# Patient Record
Sex: MALE
[De-identification: names, ages, dates, MRNs, and addresses within clinical notes are randomized per-mention and may not be internally consistent; named-entity substitution may affect disease eponyms.]

---

## 2020-01-16 ENCOUNTER — HOSPITAL ENCOUNTER (EMERGENCY)
Dept: HOSPITAL 92 - ERS | Age: 70
Discharge: HOME | End: 2020-01-16
Payer: MEDICARE

## 2020-01-16 DIAGNOSIS — L60.0: ICD-10-CM

## 2020-01-16 DIAGNOSIS — Z79.899: ICD-10-CM

## 2020-01-16 DIAGNOSIS — F41.9: ICD-10-CM

## 2020-01-16 DIAGNOSIS — I25.2: ICD-10-CM

## 2020-01-16 DIAGNOSIS — Z79.82: ICD-10-CM

## 2020-01-16 DIAGNOSIS — I25.10: ICD-10-CM

## 2020-01-16 DIAGNOSIS — I10: ICD-10-CM

## 2020-01-16 DIAGNOSIS — Z79.01: ICD-10-CM

## 2020-01-16 DIAGNOSIS — Z95.5: ICD-10-CM

## 2020-01-16 DIAGNOSIS — L03.116: Primary | ICD-10-CM

## 2020-01-16 LAB
ALBUMIN SERPL BCG-MCNC: 4.2 G/DL (ref 3.4–4.8)
ALP SERPL-CCNC: 88 U/L (ref 40–110)
ALT SERPL W P-5'-P-CCNC: 36 U/L (ref 8–55)
ANION GAP SERPL CALC-SCNC: 10 MMOL/L (ref 10–20)
AST SERPL-CCNC: 22 U/L (ref 5–34)
BASOPHILS # BLD AUTO: 0.1 THOU/UL (ref 0–0.2)
BASOPHILS NFR BLD AUTO: 0.7 % (ref 0–1)
BILIRUB SERPL-MCNC: 0.9 MG/DL (ref 0.2–1.2)
BUN SERPL-MCNC: 17 MG/DL (ref 8.4–25.7)
CALCIUM SERPL-MCNC: 9.2 MG/DL (ref 7.8–10.44)
CHLORIDE SERPL-SCNC: 104 MMOL/L (ref 98–107)
CO2 SERPL-SCNC: 27 MMOL/L (ref 23–31)
CREAT CL PREDICTED SERPL C-G-VRATE: 0 ML/MIN (ref 70–130)
EOSINOPHIL # BLD AUTO: 0.2 THOU/UL (ref 0–0.7)
EOSINOPHIL NFR BLD AUTO: 2 % (ref 0–10)
GLOBULIN SER CALC-MCNC: 3 G/DL (ref 2.4–3.5)
GLUCOSE SERPL-MCNC: 191 MG/DL (ref 83–110)
HGB BLD-MCNC: 14 G/DL (ref 14–18)
LYMPHOCYTES # BLD: 1.1 THOU/UL (ref 1.2–3.4)
LYMPHOCYTES NFR BLD AUTO: 12.7 % (ref 21–51)
MCH RBC QN AUTO: 33.4 PG (ref 27–31)
MCV RBC AUTO: 101 FL (ref 78–98)
MONOCYTES # BLD AUTO: 0.6 THOU/UL (ref 0.11–0.59)
MONOCYTES NFR BLD AUTO: 7.2 % (ref 0–10)
NEUTROPHILS # BLD AUTO: 6.9 THOU/UL (ref 1.4–6.5)
NEUTROPHILS NFR BLD AUTO: 77.4 % (ref 42–75)
PLATELET # BLD AUTO: 191 THOU/UL (ref 130–400)
POTASSIUM SERPL-SCNC: 4.4 MMOL/L (ref 3.5–5.1)
RBC # BLD AUTO: 4.17 MILL/UL (ref 4.7–6.1)
SODIUM SERPL-SCNC: 137 MMOL/L (ref 136–145)
WBC # BLD AUTO: 8.9 THOU/UL (ref 4.8–10.8)

## 2020-01-16 PROCEDURE — 80053 COMPREHEN METABOLIC PANEL: CPT

## 2020-01-16 PROCEDURE — 99283 EMERGENCY DEPT VISIT LOW MDM: CPT

## 2020-01-16 PROCEDURE — 85025 COMPLETE CBC W/AUTO DIFF WBC: CPT

## 2020-01-16 PROCEDURE — 36415 COLL VENOUS BLD VENIPUNCTURE: CPT

## 2020-01-21 ENCOUNTER — HOSPITAL ENCOUNTER (INPATIENT)
Dept: HOSPITAL 92 - ERS | Age: 70
LOS: 4 days | Discharge: HOME | DRG: 872 | End: 2020-01-25
Attending: HOSPITALIST | Admitting: HOSPITALIST
Payer: MEDICARE

## 2020-01-21 VITALS — BODY MASS INDEX: 31.8 KG/M2

## 2020-01-21 DIAGNOSIS — F41.9: ICD-10-CM

## 2020-01-21 DIAGNOSIS — E86.0: ICD-10-CM

## 2020-01-21 DIAGNOSIS — I25.10: ICD-10-CM

## 2020-01-21 DIAGNOSIS — Z98.890: ICD-10-CM

## 2020-01-21 DIAGNOSIS — Z85.038: ICD-10-CM

## 2020-01-21 DIAGNOSIS — Z88.0: ICD-10-CM

## 2020-01-21 DIAGNOSIS — I87.2: ICD-10-CM

## 2020-01-21 DIAGNOSIS — Z95.5: ICD-10-CM

## 2020-01-21 DIAGNOSIS — E03.9: ICD-10-CM

## 2020-01-21 DIAGNOSIS — E78.5: ICD-10-CM

## 2020-01-21 DIAGNOSIS — L03.116: ICD-10-CM

## 2020-01-21 DIAGNOSIS — I10: ICD-10-CM

## 2020-01-21 DIAGNOSIS — L60.0: ICD-10-CM

## 2020-01-21 DIAGNOSIS — B95.4: ICD-10-CM

## 2020-01-21 DIAGNOSIS — Z91.048: ICD-10-CM

## 2020-01-21 DIAGNOSIS — Z87.891: ICD-10-CM

## 2020-01-21 DIAGNOSIS — A41.9: Primary | ICD-10-CM

## 2020-01-21 DIAGNOSIS — E11.9: ICD-10-CM

## 2020-01-21 DIAGNOSIS — I25.2: ICD-10-CM

## 2020-01-21 LAB
ALBUMIN SERPL BCG-MCNC: 4 G/DL (ref 3.4–4.8)
ALP SERPL-CCNC: 103 U/L (ref 40–110)
ALT SERPL W P-5'-P-CCNC: 45 U/L (ref 8–55)
ANION GAP SERPL CALC-SCNC: 12 MMOL/L (ref 10–20)
AST SERPL-CCNC: 27 U/L (ref 5–34)
BASOPHILS # BLD AUTO: 0 THOU/UL (ref 0–0.2)
BASOPHILS NFR BLD AUTO: 0.5 % (ref 0–1)
BILIRUB SERPL-MCNC: 0.6 MG/DL (ref 0.2–1.2)
BUN SERPL-MCNC: 25 MG/DL (ref 8.4–25.7)
CALCIUM SERPL-MCNC: 9.7 MG/DL (ref 7.8–10.44)
CHLORIDE SERPL-SCNC: 102 MMOL/L (ref 98–107)
CO2 SERPL-SCNC: 28 MMOL/L (ref 23–31)
CREAT CL PREDICTED SERPL C-G-VRATE: 0 ML/MIN (ref 70–130)
EOSINOPHIL # BLD AUTO: 0.3 THOU/UL (ref 0–0.7)
EOSINOPHIL NFR BLD AUTO: 3.3 % (ref 0–10)
GLOBULIN SER CALC-MCNC: 3.9 G/DL (ref 2.4–3.5)
GLUCOSE SERPL-MCNC: 135 MG/DL (ref 83–110)
HGB BLD-MCNC: 14.1 G/DL (ref 14–18)
LYMPHOCYTES # BLD: 1.6 THOU/UL (ref 1.2–3.4)
LYMPHOCYTES NFR BLD AUTO: 18.8 % (ref 21–51)
MCH RBC QN AUTO: 33.8 PG (ref 27–31)
MCV RBC AUTO: 102 FL (ref 78–98)
MONOCYTES # BLD AUTO: 0.8 THOU/UL (ref 0.11–0.59)
MONOCYTES NFR BLD AUTO: 9.4 % (ref 0–10)
NEUTROPHILS # BLD AUTO: 5.7 THOU/UL (ref 1.4–6.5)
NEUTROPHILS NFR BLD AUTO: 67.9 % (ref 42–75)
PLATELET # BLD AUTO: 267 THOU/UL (ref 130–400)
POTASSIUM SERPL-SCNC: 4.5 MMOL/L (ref 3.5–5.1)
RBC # BLD AUTO: 4.16 MILL/UL (ref 4.7–6.1)
SODIUM SERPL-SCNC: 137 MMOL/L (ref 136–145)
WBC # BLD AUTO: 8.4 THOU/UL (ref 4.8–10.8)

## 2020-01-21 PROCEDURE — 83880 ASSAY OF NATRIURETIC PEPTIDE: CPT

## 2020-01-21 PROCEDURE — 96375 TX/PRO/DX INJ NEW DRUG ADDON: CPT

## 2020-01-21 PROCEDURE — 80048 BASIC METABOLIC PNL TOTAL CA: CPT

## 2020-01-21 PROCEDURE — 36415 COLL VENOUS BLD VENIPUNCTURE: CPT

## 2020-01-21 PROCEDURE — 90662 IIV NO PRSV INCREASED AG IM: CPT

## 2020-01-21 PROCEDURE — 85025 COMPLETE CBC W/AUTO DIFF WBC: CPT

## 2020-01-21 PROCEDURE — G0008 ADMIN INFLUENZA VIRUS VAC: HCPCS

## 2020-01-21 PROCEDURE — 83605 ASSAY OF LACTIC ACID: CPT

## 2020-01-21 PROCEDURE — 90471 IMMUNIZATION ADMIN: CPT

## 2020-01-21 PROCEDURE — 80053 COMPREHEN METABOLIC PANEL: CPT

## 2020-01-21 PROCEDURE — S0028 INJECTION, FAMOTIDINE, 20 MG: HCPCS

## 2020-01-21 PROCEDURE — 96365 THER/PROPH/DIAG IV INF INIT: CPT

## 2020-01-22 LAB
ANION GAP SERPL CALC-SCNC: 13 MMOL/L (ref 10–20)
BASOPHILS # BLD AUTO: 0 THOU/UL (ref 0–0.2)
BASOPHILS NFR BLD AUTO: 0.3 % (ref 0–1)
BUN SERPL-MCNC: 22 MG/DL (ref 8.4–25.7)
CALCIUM SERPL-MCNC: 9.1 MG/DL (ref 7.8–10.44)
CHLORIDE SERPL-SCNC: 104 MMOL/L (ref 98–107)
CO2 SERPL-SCNC: 24 MMOL/L (ref 23–31)
CREAT CL PREDICTED SERPL C-G-VRATE: 85 ML/MIN (ref 70–130)
EOSINOPHIL # BLD AUTO: 0.2 THOU/UL (ref 0–0.7)
EOSINOPHIL NFR BLD AUTO: 3.1 % (ref 0–10)
GLUCOSE SERPL-MCNC: 171 MG/DL (ref 83–110)
HGB BLD-MCNC: 12.9 G/DL (ref 14–18)
LYMPHOCYTES # BLD: 1.5 THOU/UL (ref 1.2–3.4)
LYMPHOCYTES NFR BLD AUTO: 19.6 % (ref 21–51)
MCH RBC QN AUTO: 35.5 PG (ref 27–31)
MCV RBC AUTO: 103 FL (ref 78–98)
MONOCYTES # BLD AUTO: 0.7 THOU/UL (ref 0.11–0.59)
MONOCYTES NFR BLD AUTO: 9.6 % (ref 0–10)
NEUTROPHILS # BLD AUTO: 5.1 THOU/UL (ref 1.4–6.5)
NEUTROPHILS NFR BLD AUTO: 67.5 % (ref 42–75)
PLATELET # BLD AUTO: 241 THOU/UL (ref 130–400)
POTASSIUM SERPL-SCNC: 4.6 MMOL/L (ref 3.5–5.1)
RBC # BLD AUTO: 3.64 MILL/UL (ref 4.7–6.1)
SODIUM SERPL-SCNC: 136 MMOL/L (ref 136–145)
WBC # BLD AUTO: 7.5 THOU/UL (ref 4.8–10.8)

## 2020-01-22 RX ADMIN — FAMOTIDINE SCH MG: 10 INJECTION, SOLUTION INTRAVENOUS at 20:19

## 2020-01-22 RX ADMIN — CEFTRIAXONE SCH MLS: 1 INJECTION, POWDER, FOR SOLUTION INTRAMUSCULAR; INTRAVENOUS at 20:19

## 2020-01-22 RX ADMIN — FAMOTIDINE SCH MG: 10 INJECTION, SOLUTION INTRAVENOUS at 09:16

## 2020-01-22 RX ADMIN — ASPIRIN 81 MG CHEWABLE TABLET SCH MG: 81 TABLET CHEWABLE at 20:19

## 2020-01-22 NOTE — PDOC.HOSPP
- Subjective


Encounter Date: 01/22/20


Encounter Time: 07:45


Subjective: 





no leg pain 


says swelling in left leg has come down a bit





- Objective


Vital Signs & Weight: 


 Vital Signs (12 hours)











  Temp Pulse Resp BP BP BP Pulse Ox


 


 01/22/20 11:31  98.2 F  64  20   154/87 H   96


 


 01/22/20 09:14   69   135/82   


 


 01/22/20 09:10        96


 


 01/22/20 07:21  98 F  69  18   135/82   96


 


 01/22/20 05:32  98.0 F  65  20    126/69  96


 


 01/22/20 00:29        97








 Weight











Weight                         254 lb 9.6 oz














Result Diagrams: 


 01/22/20 05:45





 01/22/20 05:45





Hospitalist ROS





- Medication


Medications: 


Active Medications











Generic Name Dose Route Start Last Admin





  Trade Name Freq  PRN Reason Stop Dose Admin


 


Acetaminophen  650 mg  01/22/20 00:00  01/22/20 12:03





  Tylenol  PO   650 mg





  Q4H PRN   Administration





  Headache/Fever/Mild Pain (1-3)   





     





     





     


 


Apixaban  5 mg  01/22/20 09:00  01/22/20 09:15





  Eliquis  PO   5 mg





  BID KARLA   Administration





     





     





     





     


 


Atorvastatin Calcium  40 mg  01/22/20 09:00  01/22/20 09:14





  Lipitor  PO   40 mg





  DAILY KARLA   Administration





     





     





     





     


 


Famotidine  20 mg  01/22/20 09:00  01/22/20 09:16





  Pepcid  SLOW IVP   20 mg





  Q12HR KARLA   Administration





     





     





     





     


 


Cefepime HCl 2 gm/ Sodium  100 mls @ 200 mls/hr  01/22/20 09:00  01/22/20 09:16





  Chloride  IVPB   100 mls





  Q12HR KARLA   Administration





     





     





     





     


 


Clindamycin Phosphate/Dextrose  50 mls @ 100 mls/hr  01/22/20 06:00  01/22/20 04

:22





  900 mg/ Device  IVPB   50 mls





  Q8HR KARLA   Administration





     





     





     





     


 


Levothyroxine Sodium  175 mcg  01/22/20 06:00  01/22/20 04:23





  Synthroid  PO   175 mcg





  0600 KARLA   Administration





     





     





     





     


 


Losartan Potassium  100 mg  01/22/20 09:00  01/22/20 09:14





  Cozaar  PO   100 mg





  DAILY KARLA   Administration





     





     





     





     














- Exam


General Appearance: NAD, awake alert


Eye: PERRL, anicteric sclera


ENT: no oropharyngeal lesions, moist mucosa


Neck: supple, no JVD


Heart: RRR, no murmur


Respiratory: no wheezes, no rales


Gastrointestinal: soft, non-tender, non-distended, normal bowel sounds


Extremities: 1+ LE edema


Extremities - other findings: left leg blistering and erythema++


Neurological: cranial nerve grossly intact, no focal deficits


Psychiatric: normal affect, A&O x 3





Hosp A/P


(1) Left leg cellulitis


Code(s): L03.116 - CELLULITIS OF LEFT LOWER LIMB   Status: Acute   





(2) HTN (hypertension)


Code(s): I10 - ESSENTIAL (PRIMARY) HYPERTENSION   Status: Chronic   


Qualifiers: 


   Hypertension type: essential hypertension   Qualified Code(s): I10 - 

Essential (primary) hypertension   





(3) Hypothyroidism


Code(s): E03.9 - HYPOTHYROIDISM, UNSPECIFIED   Status: Chronic   


Qualifiers: 


   Hypothyroidism type: unspecified   Qualified Code(s): E03.9 - Hypothyroidism

, unspecified   





(4) DM type 2 (diabetes mellitus, type 2)


Status: Chronic   


Qualifiers: 


   Diabetes mellitus long term insulin use: without long term use 





(5) CAD (coronary artery disease)


Code(s): I25.10 - ATHSCL HEART DISEASE OF NATIVE CORONARY ARTERY W/O ANG PCTRS 

  Status: Chronic   


Qualifiers: 


   Coronary Disease-Associated Artery/Lesion type: native artery   Native vs. 

transplanted heart: native heart   Associated angina: without angina   

Qualified Code(s): I25.10 - Atherosclerotic heart disease of native coronary 

artery without angina pectoris   





(6) Anxiety disorder


Code(s): F41.9 - ANXIETY DISORDER, UNSPECIFIED   Status: Chronic   


Qualifiers: 


   Anxiety disorder type: generalized anxiety disorder   Qualified Code(s): 

F41.1 - Generalized anxiety disorder   





(7) Dyslipidemia


Code(s): E78.5 - HYPERLIPIDEMIA, UNSPECIFIED   Status: Chronic   





- Plan





is on clindamycin, cefepime and vancomycin


has ingrown toe nail with infection


podiatry and ID consultation


has extensive cellulitis with blistering of leg


continue home meds norvasc, cozaar, synthroid, elavil, eliquis, lipitor


hemostable

## 2020-01-22 NOTE — CON
DATE OF CONSULTATION:  01/22/2020



REASON FOR CONSULTATION:  Cellulitis , left leg.



HISTORY OF PRESENT ILLNESS:  A 69-year-old, history of coronary artery disease,

hypertension, and diabetes mellitus, type 2 as well as previous episodes of

cellulitis, left lower extremity in [place].  The patient moved here about 2 years

ago in part of his custodial package, and he noticed development of redness in the

left lower extremity and was seen reportedly by podiatrist, but he did have ER visit

3 days before admission.  He was given oral antimicrobial therapy with clindamycin,

and due to persistence and exacerbation of inflammatory process, he came back and is

admitted.  Now, he is feeling better.  No headaches, visual symptoms, sore throat,

odynophagia, or dysphagia.  No cough, sputum production, or chest pain.  No

abdominal pain or diarrhea.  No genitourinary symptoms.  No joint symptoms.  No

neurological symptoms. 



PAST MEDICAL HISTORY:  Type 2 diabetes, coronary artery disease, MI, hypertension,

prior episodes of cellulitis in the lower extremities, and anxiety. 



PAST SURGICAL HISTORY:  Appendectomy, hernia repair, coronary artery stent, and had

knee and wrist surgeries, probably arthroscopy of the knee. 



FAMILY HISTORY:  Noncontributory.



SOCIAL HISTORY:  Quit smoking many years ago.  Drinks occasionally.  He used to work

as a  and moved from [place] to this area. 



ALLERGIES:  PENICILLIN WITH HIVES WHEN HE WAS AT THE AGE OF 4.



CURRENT MEDICATIONS:  

1. Elavil.

2. Norvasc.

3. Eliquis.

4. Aspirin.

5. Lipitor.

6. Cefepime.

7. Clindamycin.

8. Pepcid.

9. Synthroid.

10. Vancomycin.



PHYSICAL EXAMINATION:

VITAL SIGNS:  T-max 98.2, blood pressure 150/80, pulse 64, respirations 20, O2

saturation 96%. 

SKIN:  With a circumferential area of erythema extending from a very sharply

demarcated area right above the foot to the two-thirds up the left leg.  In the

lower segment anterior aspect, there is a central area of what appears to be early

blistering.  Most of the erythema is localized in the anterior segment.  The

posterior is somewhat spared.  The patient has a peripheral IV access and is voiding

in the toilet. 

LYMPHATICS:  No lymphadenopathy. 

HEENT:  Ocular movements are conjugate.  Sclerae are white.  Pupils are equal.  Oral

cavity with no native teeth remaining.  Oral mucosa is normal. 

NECK:  Supple. 

LUNGS:  Symmetric, clear breath sounds. 

HEART:  S1 and S2.  Regular rate.  No murmurs. 

ABDOMEN:  Soft.  Not distended or tender.  No ascites.  No bladder distention. 

MUSCULOSKELETAL:  No joint inflammatory activity. 

EXTREMITIES:  Pulses 1+ in dorsalis pedis. 

NEUROLOGIC:  Nonfocal including cognitive function.



LABORATORY DATA:  WBC 8.4 and 7.5, hemoglobin 12.9, , and platelets 241.

Creatinine was 1.48, now 1.3.  Liver profile, normal.  Albumin 4.0. 



He has tibia-fibula x-ray, which was not remarkable. 



Blood cultures are pending.



ASSESSMENT:  

1. Coronary artery disease.

2. Prior cellulitis with evidence of venous insufficiency with stasis dermatitis in

the past. 

3. New onset of inflammatory process consistent with cellulitis/erysipelas.

4. Left leg with central blistering.



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

associated with venous insufficiency.  The history of allergy to penicillin is

remote, and typically, those patients tolerate rechallenge with penicillin.  We will

switch him to Rocephin 1 g daily and then follow up once improved.  Switch to Keflex

for suppressive therapy.  Pen-Vee K can be given here in the hospital as a challenge

to allow prescription after discharge planning. 







Job ID:  [id number]

## 2020-01-22 NOTE — RAD
LEFT FOOT THREE VIEWS:

 

HISTORY:

Cellulitis.

 

COMPARISON:

None.

 

FINDINGS:

There is a small erosion of the peroneus brevis insertion 5th metatarsal tuberosity. Lisfranc interva
l is maintained. Type III navicular ossicle.

 

No acute fracture or malalignment. Extensive soft tissue swelling around the ankle.

 

IMPRESSION:

1. Extensive soft tissue swelling without fracture or malalignment.

 

2. Small erosion of the 5th metatarsal proximal tuberosity, likely sequela of underlying tendinosis o
f peroneus brevis and less likely osteomyelitis.

 

3. Likely an old fracture of the 5th proximal phalanx diaphysis. Recommend correlation with focal ten
derness.

 

POS: [facility]

## 2020-01-22 NOTE — HP
TIME OF ASSESSMENT:  2200



CHIEF COMPLAINT:  Left lower leg swelling and redness.



HISTORY OF PRESENT ILLNESS:  Mr. Russell is a 69-year-old gentleman, who presents

with worsening redness and swelling involving the left lower extremity.  The patient

states he has had issues with cellulitis involving the same leg in the past.  He was

recently seen in the emergency department on January 17, 2020 with left lower leg

redness x1 day.  He was discharged with recommendations to follow up with Podiatry

as the initial source of infection was an ingrown toe involving the left great toe

and given a prescription for clindamycin.  The patient states he has continued to

take it with persistent worsening in the swelling and redness involving the left

lower extremity.  The patient states that it has not started seeping.  This has

prompted him to return to the hospital.  He states since being on the antibiotics,

he has not noted any improvement whatsoever.  He reports having chills for the last

couple of days on occasion.  Denies having any sweats.  Occasionally feels nauseated

when the pain in his leg intensifies.  Otherwise, denies any nausea or vomiting.

Denies any chest pain, palpitations, or shortness of breath.  No dizziness or

lightheadedness.  All other review of systems are negative.  His appetite, however,

has diminished, but he continues to maintain adequate hydration and has been

supplementing with shakes as well as Pedialyte. 



PAST MEDICAL HISTORY:  

1. Coronary artery disease.

2. History of MI.

3. Hypertension.

4. Borderline diabetes.

5. Anxiety.



PAST SURGICAL HISTORY:  

1. Appendectomy.

2. Hernia repair.

3. Orthopedic surgery.

4. Coronary artery stents x4.



FAMILY HISTORY:  Noncontributory.



SOCIAL HISTORY:  The patient reports drinking alcohol occasionally.  Denies any drug

use.  Reports smoking in the past, but quit 22 years ago. 



ALLERGIES:  PENICILLIN.



CURRENT MEDICATIONS:  

1. Losartan.

2. Levothyroxine.

3. Amlodipine.

4. Atorvastatin.

5. Amitriptyline.

6. Eliquis.

7. Aspirin.

8. Glucosamine/chondroitin.



PHYSICAL EXAMINATION:

GENERAL:  The patient appears well developed, well nourished, is in no acute

distress. 

VITAL SIGNS:  Temperature 98.2, pulse 67, respirations 20, O2 saturation 97% on room

air, blood pressure 147/80. 

HEENT:  Normocephalic and atraumatic.  Pupils are equal, round, and reactive to

light.  Sclerae are without icterus.  Oropharynx is clear.  Dry oral mucosa. 

NECK:  Supple. 

LUNGS:  Clear to auscultation bilaterally without any wheezes, rales, or rhonchi. 

CARDIAC:  Regular rate and rhythm without audible murmurs, rubs, or gallops. 

ABDOMEN:  Soft, obese, nontender, and nondistended.  Normoactive bowel sounds

present.  No guarding or rigidity.  No renal angle tenderness. 

EXTREMITIES:  Notable for significant swelling and erythema involving the left lower

extremity.  He has some blistering on the anterior lower leg with weeping.

Sensation intact.  Left lower leg is warm to touch. 

NEUROLOGIC:  Alert and oriented x3.



INVESTIGATIONS/LABORATORY DATA:  Showed a white count of 8.4, hemoglobin 14.1,

hematocrit of 42.3, platelets 267, neutrophils 67.9.  Sodium 137, potassium 4.5, BUN

25, creatinine 1.48, GFR 47, glucose 135, lactic acid 0.6, calcium 9.7, total

bilirubin 0.6, AST 27, ALT 45, alkaline phosphatase 103.  BNP negative.  Protein

7.9, albumin 4.9. 



IMPRESSION AND PLAN:  Mr. Russell is a pleasant 69-year-old gentleman, who is being

admitted for management of the following. 

1. Left lower extremity cellulitis.  The patient initially began to have redness and

swelling on January 16, 2020, seen in the ED the following day and started on

clindamycin.  He has continued to have worsening of the redness and swelling since

then with absolutely no improvement.  He has been taking the clindamycin

consistently.  Reports occasional chills.  White blood count normal.  We will add on

a lactic acid.  We will continue IV antibiotics.  We will obtain x-rays to rule out

any underlying osteomyelitis.  Wound Care will be consulted.  Obtain venous Doppler. 

2. Hypertension.  Monitor blood pressure and resume home medications once verified.

3. Dehydration.  Patient clinically dehydrated with dry oral mucosa and dry skin.

States he has been trying his best to maintain adequate fluid intake.  We will give

gentle hydration.  No previous renal functions to compare to, but currently his

creatinine is 1.48. 

4. Coronary artery disease.  Resume home medications once verified.

5. Gastrointestinal prophylaxis with famotidine.

6. Code status full.  Surrogate decision maker is his wife, Melisa Russell. 



The case was discussed with the attending who agrees with plan of care as described

above. 







Job ID:  [id number]

## 2020-01-22 NOTE — RAD
LEFT TIBIA AND FIBULA TWO VIEWS:

 

HISTORY:

Cellulitis.

 

COMPARISON:

None.

 

FINDINGS:

Mild lateral compartment joint space narrowing of the knee. Circumferential swelling of the superfici
al soft tissues of the tibia and fibula. There are mild vascular calcifications. Incompletely evaluat
ed plantar calcaneal spur, moderate in size.

 

No acute fracture or malalignment. No erosions or periostitis. There are medial and lateral tibial sp
ine osteophytes.

 

IMPRESSION:

Circumferential swelling can be seen with cellulitis. No definite evidence for osteomyelitis.

 

POS: [facility]

## 2020-01-23 RX ADMIN — CEFTRIAXONE SCH MLS: 1 INJECTION, POWDER, FOR SOLUTION INTRAMUSCULAR; INTRAVENOUS at 20:29

## 2020-01-23 RX ADMIN — HYDROCODONE BITARTRATE AND ACETAMINOPHEN PRN TAB: 7.5; 325 TABLET ORAL at 21:26

## 2020-01-23 RX ADMIN — FAMOTIDINE SCH MG: 10 INJECTION, SOLUTION INTRAVENOUS at 09:21

## 2020-01-23 RX ADMIN — ASPIRIN 81 MG CHEWABLE TABLET SCH MG: 81 TABLET CHEWABLE at 20:29

## 2020-01-23 RX ADMIN — FAMOTIDINE SCH MG: 10 INJECTION, SOLUTION INTRAVENOUS at 20:29

## 2020-01-23 RX ADMIN — HYDROCODONE BITARTRATE AND ACETAMINOPHEN PRN TAB: 7.5; 325 TABLET ORAL at 05:15

## 2020-01-23 RX ADMIN — HYDROCODONE BITARTRATE AND ACETAMINOPHEN PRN TAB: 7.5; 325 TABLET ORAL at 09:20

## 2020-01-23 NOTE — PDOC.HOSPP
- Subjective


Encounter Date: 01/23/20


Encounter Time: 09:30


Subjective: 





Pain is better in leg, ambulating in room. 


Gets nauseated with morphine. 





- Objective


Vital Signs & Weight: 


 Vital Signs (12 hours)











  Temp Pulse Resp BP Pulse Ox


 


 01/23/20 09:15      95


 


 01/23/20 07:30  98.0 F  67  18  113/71  95








 Weight











Admit Weight                   254 lb 9.6 oz


 


Weight                         254 lb 9.6 oz














I&O: 


 











 01/22/20 01/23/20 01/24/20





 06:59 06:59 06:59


 


Intake Total  1600 


 


Balance  1600 











Result Diagrams: 


 01/22/20 05:45





 01/22/20 05:45





Hospitalist ROS





- Medication


Medications: 


Active Medications











Generic Name Dose Route Start Last Admin





  Trade Name Freq  PRN Reason Stop Dose Admin


 


Acetaminophen  650 mg  01/22/20 00:00  01/23/20 03:26





  Tylenol  PO   650 mg





  Q4H PRN   Administration





  Headache/Fever/Mild Pain (1-3)   





     





     





     


 


Hydrocodone Bitart/Acetaminophen  1 tab  01/22/20 18:21  01/23/20 09:20





  Norco 7.5/325  PO   1 tab





  Q4H PRN   Administration





  Pain   





     





     





     


 


Amitriptyline HCl  50 mg  01/22/20 21:00  01/22/20 20:19





  Elavil  PO   50 mg





  HS KARLA   Administration





     





     





     





     


 


Amlodipine Besylate  5 mg  01/22/20 15:00  01/22/20 15:22





  Norvasc  PO   5 mg





  1500 KARLA   Administration





     





     





     





     


 


Apixaban  5 mg  01/22/20 09:00  01/23/20 09:22





  Eliquis  PO   5 mg





  BID KARLA   Administration





     





     





     





     


 


Aspirin  81 mg  01/22/20 21:00  01/22/20 20:19





  Aspirin Chewable  PO   81 mg





  HS KARLA   Administration





     





     





     





     


 


Atorvastatin Calcium  40 mg  01/22/20 09:00  01/23/20 09:22





  Lipitor  PO   40 mg





  DAILY KARLA   Administration





     





     





     





     


 


Famotidine  20 mg  01/22/20 09:00  01/23/20 09:21





  Pepcid  SLOW IVP   20 mg





  Q12HR KARLA   Administration





     





     





     





     


 


Ceftriaxone Sodium 1 gm/  100 mls @ 200 mls/hr  01/22/20 21:00  01/22/20 20:19





  Sodium Chloride  IVPB   100 mls





  Q24HR KARLA   Administration





     





     





     





     


 


Ibuprofen  400 mg  01/22/20 15:56  01/22/20 16:23





  Motrin  PO   400 mg





  Q8H PRN   Administration





  Moderate Pain (4-6)   





     





     





     


 


Levothyroxine Sodium  175 mcg  01/22/20 06:00  01/23/20 03:26





  Synthroid  PO   175 mcg





  0600 KARLA   Administration





     





     





     





     


 


Losartan Potassium  100 mg  01/22/20 09:00  01/23/20 09:21





  Cozaar  PO   100 mg





  DAILY KARLA   Administration





     





     





     





     


 


Mupirocin  0 gm  01/23/20 09:00  01/23/20 10:57





  Bactroban 2% Ointment  TOP   1 applic





  DAILY KARLA   Administration





     





     





     





     














- Exam


General Appearance: NAD, awake alert


Eye: PERRL, anicteric sclera


ENT: no oropharyngeal lesions, moist mucosa


Neck: supple, no JVD


Heart: RRR, no murmur


Respiratory: no wheezes, no rales


Gastrointestinal: soft, non-distended, normal bowel sounds


Extremities - other findings: L leg erythema and edema is receding/ in dressing


Neurological: cranial nerve grossly intact, no focal deficits


Psychiatric: normal affect, A&O x 3





Hosp A/P


(1) Left leg cellulitis


Code(s): L03.116 - CELLULITIS OF LEFT LOWER LIMB   Status: Acute   





(2) HTN (hypertension)


Code(s): I10 - ESSENTIAL (PRIMARY) HYPERTENSION   Status: Chronic   


Qualifiers: 


   Hypertension type: essential hypertension   Qualified Code(s): I10 - 

Essential (primary) hypertension   





(3) Hypothyroidism


Code(s): E03.9 - HYPOTHYROIDISM, UNSPECIFIED   Status: Chronic   


Qualifiers: 


   Hypothyroidism type: unspecified   Qualified Code(s): E03.9 - Hypothyroidism

, unspecified   





(4) DM type 2 (diabetes mellitus, type 2)


Status: Chronic   


Qualifiers: 


   Diabetes mellitus long term insulin use: without long term use 





(5) CAD (coronary artery disease)


Code(s): I25.10 - ATHSCL HEART DISEASE OF NATIVE CORONARY ARTERY W/O ANG PCTRS 

  Status: Chronic   


Qualifiers: 


   Coronary Disease-Associated Artery/Lesion type: native artery   Native vs. 

transplanted heart: native heart   Associated angina: without angina   

Qualified Code(s): I25.10 - Atherosclerotic heart disease of native coronary 

artery without angina pectoris   





(6) Anxiety disorder


Code(s): F41.9 - ANXIETY DISORDER, UNSPECIFIED   Status: Chronic   


Qualifiers: 


   Anxiety disorder type: generalized anxiety disorder   Qualified Code(s): 

F41.1 - Generalized anxiety disorder   





(7) Dyslipidemia


Code(s): E78.5 - HYPERLIPIDEMIA, UNSPECIFIED   Status: Chronic   





- Plan





is on ceftriaxone


has ingrown toe nail with infection, s/p removal of nail by Nuha Roblero


has extensive cellulitis with blistering of leg


continue home meds norvasc, cozaar, synthroid, elavil, eliquis, lipitor


hemostable

## 2020-01-23 NOTE — PRG
DATE OF SERVICE:  01/23/2020



SUBJECTIVE:  Dr. Breen removed an ingrown toenail from the left foot.  The leg

itself is not as swollen and still moderately painful.  No respiratory or abdominal

symptoms. 



OBJECTIVE:  VITAL SIGNS:  Normal.  He has been afebrile. 

EXTREMITIES:  The left leg cellulitis is improving with less erythema.  There is a

central anterior area of blistering with sort of yellow serous fluid inside. 



LABORATORY DATA:  White cell count 7.5, hemoglobin 12.9, platelets 241.  Creatinine

1.3, which has improved.  No microbiology information. 



ASSESSMENT AND DISCUSSION:  Coronary artery disease, cellulitis in the past with now

recurrence likely beta-hemolytic Streptococcal cellulitis, blistering associated

with it.  Continue Rocephin.  Maybe tomorrow or Saturday go home on Keflex and then

Pen-Vee K suppressive therapy for 1 year. 







Job ID:  [id number]

## 2020-01-24 RX ADMIN — HYDROCODONE BITARTRATE AND ACETAMINOPHEN PRN TAB: 7.5; 325 TABLET ORAL at 20:50

## 2020-01-24 RX ADMIN — FAMOTIDINE SCH MG: 10 INJECTION, SOLUTION INTRAVENOUS at 08:44

## 2020-01-24 RX ADMIN — ASPIRIN 81 MG CHEWABLE TABLET SCH MG: 81 TABLET CHEWABLE at 20:49

## 2020-01-24 NOTE — PDOC.HOSPP
- Subjective


Encounter Date: 01/24/20


Encounter Time: 10:45


Subjective: 





feels better, no reaction to oral pen vk


leg pain is better, is ambulating in room





- Objective


Vital Signs & Weight: 


 Vital Signs (12 hours)











  Temp Pulse Resp BP Pulse Ox


 


 01/24/20 11:22  97.9 F  74  20  113/72  98


 


 01/24/20 07:48  98.3 F  65  18  137/82  94 L








 Weight











Admit Weight                   254 lb 9.6 oz


 


Weight                         254 lb 9.6 oz














I&O: 


 











 01/23/20 01/24/20 01/25/20





 06:59 06:59 06:59


 


Intake Total 1600  


 


Balance 1600  











Result Diagrams: 


 01/22/20 05:45





 01/22/20 05:45





Hospitalist ROS





- Medication


Medications: 


Active Medications











Generic Name Dose Route Start Last Admin





  Trade Name Freq  PRN Reason Stop Dose Admin


 


Acetaminophen  650 mg  01/22/20 00:00  01/24/20 08:43





  Tylenol  PO   650 mg





  Q4H PRN   Administration





  Headache/Fever/Mild Pain (1-3)   





     





     





     


 


Hydrocodone Bitart/Acetaminophen  1 tab  01/22/20 18:21  01/23/20 21:26





  Norco 7.5/325  PO   1 tab





  Q4H PRN   Administration





  Pain   





     





     





     


 


Amitriptyline HCl  50 mg  01/22/20 21:00  01/23/20 20:29





  Elavil  PO   50 mg





  HS KARLA   Administration





     





     





     





     


 


Amlodipine Besylate  5 mg  01/22/20 15:00  01/23/20 15:01





  Norvasc  PO   5 mg





  1500 KARLA   Administration





     





     





     





     


 


Apixaban  5 mg  01/22/20 09:00  01/24/20 08:42





  Eliquis  PO   5 mg





  BID KARLA   Administration





     





     





     





     


 


Aspirin  81 mg  01/22/20 21:00  01/23/20 20:29





  Aspirin Chewable  PO   81 mg





  HS KARLA   Administration





     





     





     





     


 


Atorvastatin Calcium  40 mg  01/22/20 09:00  01/24/20 08:42





  Lipitor  PO   40 mg





  DAILY KARLA   Administration





     





     





     





     


 


Famotidine  20 mg  01/22/20 09:00  01/24/20 08:44





  Pepcid  SLOW IVP   20 mg





  Q12HR KARLA   Administration





     





     





     





     


 


Ibuprofen  400 mg  01/22/20 15:56  01/22/20 16:23





  Motrin  PO   400 mg





  Q8H PRN   Administration





  Moderate Pain (4-6)   





     





     





     


 


Levothyroxine Sodium  175 mcg  01/22/20 06:00  01/24/20 06:02





  Synthroid  PO   175 mcg





  0600 KARLA   Administration





     





     





     





     


 


Losartan Potassium  100 mg  01/22/20 09:00  01/24/20 08:42





  Cozaar  PO   100 mg





  DAILY KARLA   Administration





     





     





     





     


 


Mupirocin  0 gm  01/23/20 09:00  01/24/20 08:44





  Bactroban 2% Ointment  TOP   1 applic





  DAILY KARLA   Administration





     





     





     





     


 


Penicillin V Potassium  250 mg  01/23/20 21:00  01/24/20 08:42





  Penicillin V Potassium  PO   250 mg





  BID KARLA   Administration





     





     





     





     














- Exam


General Appearance: NAD, awake alert


Eye: PERRL, anicteric sclera


ENT: no oropharyngeal lesions, moist mucosa


Neck: supple, no JVD


Heart: RRR, no murmur


Respiratory: no wheezes, no rales


Gastrointestinal: soft, non-tender, non-distended, normal bowel sounds


Extremities - other findings: left leg erythema and edema is receding


Neurological: cranial nerve grossly intact, no focal deficits


Psychiatric: normal affect, A&O x 3





Hosp A/P


(1) Left leg cellulitis


Code(s): L03.116 - CELLULITIS OF LEFT LOWER LIMB   Status: Acute   





(2) HTN (hypertension)


Code(s): I10 - ESSENTIAL (PRIMARY) HYPERTENSION   Status: Chronic   


Qualifiers: 


   Hypertension type: essential hypertension   Qualified Code(s): I10 - 

Essential (primary) hypertension   





(3) Hypothyroidism


Code(s): E03.9 - HYPOTHYROIDISM, UNSPECIFIED   Status: Chronic   


Qualifiers: 


   Hypothyroidism type: unspecified   Qualified Code(s): E03.9 - Hypothyroidism

, unspecified   





(4) DM type 2 (diabetes mellitus, type 2)


Status: Chronic   


Qualifiers: 


   Diabetes mellitus long term insulin use: without long term use 





(5) CAD (coronary artery disease)


Code(s): I25.10 - ATHSCL HEART DISEASE OF NATIVE CORONARY ARTERY W/O ANG PCTRS 

  Status: Chronic   


Qualifiers: 


   Coronary Disease-Associated Artery/Lesion type: native artery   Native vs. 

transplanted heart: native heart   Associated angina: without angina   

Qualified Code(s): I25.10 - Atherosclerotic heart disease of native coronary 

artery without angina pectoris   





(6) Anxiety disorder


Code(s): F41.9 - ANXIETY DISORDER, UNSPECIFIED   Status: Chronic   


Qualifiers: 


   Anxiety disorder type: generalized anxiety disorder   Qualified Code(s): 

F41.1 - Generalized anxiety disorder   





(7) Dyslipidemia


Code(s): E78.5 - HYPERLIPIDEMIA, UNSPECIFIED   Status: Chronic   





- Plan





is on ceftriaxone IM, lost his IV access today, difficult to get another one 

per staff.


has ingrown toe nail with infection, s/p removal of nail by Nuha Roblero


has extensive cellulitis with blistering of leg


continue home meds norvasc, cozaar, synthroid, elavil, eliquis, lipitor


hemostable


DC plan in am on KEflex x 10 days then Pen VK 500mg bid x 1 year per .

## 2020-01-25 VITALS — SYSTOLIC BLOOD PRESSURE: 123 MMHG | TEMPERATURE: 98 F | DIASTOLIC BLOOD PRESSURE: 73 MMHG

## 2020-01-25 LAB
ALBUMIN SERPL BCG-MCNC: 3.6 G/DL (ref 3.4–4.8)
ALP SERPL-CCNC: 88 U/L (ref 40–110)
ALT SERPL W P-5'-P-CCNC: 39 U/L (ref 8–55)
ANION GAP SERPL CALC-SCNC: 13 MMOL/L (ref 10–20)
AST SERPL-CCNC: 24 U/L (ref 5–34)
BASOPHILS # BLD AUTO: 0.1 THOU/UL (ref 0–0.2)
BASOPHILS NFR BLD AUTO: 0.7 % (ref 0–1)
BILIRUB SERPL-MCNC: 0.6 MG/DL (ref 0.2–1.2)
BUN SERPL-MCNC: 23 MG/DL (ref 8.4–25.7)
CALCIUM SERPL-MCNC: 9.2 MG/DL (ref 7.8–10.44)
CHLORIDE SERPL-SCNC: 102 MMOL/L (ref 98–107)
CO2 SERPL-SCNC: 26 MMOL/L (ref 23–31)
CREAT CL PREDICTED SERPL C-G-VRATE: 76 ML/MIN (ref 70–130)
EOSINOPHIL # BLD AUTO: 0.4 THOU/UL (ref 0–0.7)
EOSINOPHIL NFR BLD AUTO: 4.9 % (ref 0–10)
GLOBULIN SER CALC-MCNC: 3.6 G/DL (ref 2.4–3.5)
GLUCOSE SERPL-MCNC: 121 MG/DL (ref 83–110)
HGB BLD-MCNC: 13 G/DL (ref 14–18)
LYMPHOCYTES # BLD: 1.7 THOU/UL (ref 1.2–3.4)
LYMPHOCYTES NFR BLD AUTO: 22.5 % (ref 21–51)
MCH RBC QN AUTO: 34.5 PG (ref 27–31)
MCV RBC AUTO: 102 FL (ref 78–98)
MONOCYTES # BLD AUTO: 0.6 THOU/UL (ref 0.11–0.59)
MONOCYTES NFR BLD AUTO: 8.8 % (ref 0–10)
NEUTROPHILS # BLD AUTO: 4.6 THOU/UL (ref 1.4–6.5)
NEUTROPHILS NFR BLD AUTO: 63.1 % (ref 42–75)
PLATELET # BLD AUTO: 290 THOU/UL (ref 130–400)
POTASSIUM SERPL-SCNC: 4.9 MMOL/L (ref 3.5–5.1)
RBC # BLD AUTO: 3.77 MILL/UL (ref 4.7–6.1)
SODIUM SERPL-SCNC: 136 MMOL/L (ref 136–145)
WBC # BLD AUTO: 7.3 THOU/UL (ref 4.8–10.8)

## 2020-01-25 RX ADMIN — HYDROCODONE BITARTRATE AND ACETAMINOPHEN PRN TAB: 7.5; 325 TABLET ORAL at 06:02

## 2020-01-25 RX ADMIN — HYDROCODONE BITARTRATE AND ACETAMINOPHEN PRN TAB: 7.5; 325 TABLET ORAL at 12:56

## 2020-01-27 NOTE — PQF
CONSTANZA DAMON VINAYA KUMAR MD

[id number]                                                             T4-A-
[id number]

[id number]                             

                                   

CLINICAL DOCUMENTATION CLARIFICATION FORM:  POST DISCHARGE



Addendum to original discharge summary date:  __________________________________
____



Late entry note date:  _________________________________________________________
__











DATE:01/27/2020                                                      ATTN:
ALCIRA HOLLAND MD



Please exercise your independent, professional judgment in responding to the 
clarification form. 

Clinical indicators are provided on the bottom of this form for your review



Please check appropriate box(s):



[  ] Left leg cellulitis is due to Diabetes



[  ] Left leg cellulitis is not due to Diabetes 



[  ] Other diagnosis ___________



[ x ] Unable to determine









For continuity of documentation, please document condition throughout progress 
notes and discharge summary.  Thank You.





CLINICAL INDICATORS - SIGNS / SYMPTOMS / LABS



Left lower leg swelling and redness-Documented in H&P on 01/21 by Mallory Pérez PA-C

PMH-Borderline diabetes-Documented in H&P on 01/21 by Mallory Pérez PA-C

[id number] in H&P on 01/21 by Mallory Pérez PA-C

Left lower extremity cellulitis--Documented in H&P on 01/21 by Mallory Pérez PA-C

DM type 2 chronic-Documented in  Hospitalist PN 01/22 by Alcira Holland



RISKS:

Left lower extremity cellulitis--Documented in H&P on 01/21 by Mallory Pérez PA-C

DM type 2 chronic-Documented in  Hospitalist PN 01/22 by Alcira Holland



TREATMENT:

Is on Clindamycin, Cefepime and vancomycin--Documented in  Hospitalist PN 01/22 
by Alcira Holland



[employer] Crystal Reports Winform Viewer

(This form is maintained as a part of the permanent medical record)

2015 [employer].  All Rights Reserved

[email or web address]    [phone]
[phone]

                                                              



 





GIORGI

## 2020-01-27 NOTE — DIS
DATE OF ADMISSION:  01/21/2020



DATE OF DISCHARGE:  01/25/2020



DISCHARGE DIAGNOSES:  

1. Left leg cellulitis.

2. Hypertension.

3. Hypothyroidism.

4. Diabetes, type 2.

5. Coronary artery disease.

6. Anxiety disorder.

7. Dyslipidemia.



HOSPITAL COURSE:  Mr. Russell is a 69-year-old gentleman, who was admitted on

01/21/2020, on account of left leg swelling and redness.  The patient is stated to

have had cellulitis in the same leg and the pus.  He had recently been in the

emergency room on 01/17, with complaints of right leg swelling.  He was given

antibiotics and recommendation to follow up with Podiatry.  The patient, however,

stated that his swelling got worse, so he presented to emergency room.  Upon

presentation to the emergency room, he was diagnosed with left lower extremity

swelling as well as sepsis, was started on appropriate antibiotics and Infectious

Disease was consulted.  They saw patient and felt that patient will require

aggressive therapy.  They felt that patient's symptoms were consistent with

erysipelas and recommended for long-term antibiotics as well as suppressive therapy.

 The patient was started on antibiotics in the hospital and improved.  At the time

of discharge, it was documented that he will be discharged home on Keflex for 10

days and then penicillin  mg b.i.d. for 1 year.  At the time of discharge, the

patient was stable. 



PHYSICAL EXAMINATION:

VITAL SIGNS:  Temperature 98, blood pressure  123/73, pulse is 70, respirations 18,

and oxygen saturation  95% on room air. 

GENERAL:  Alert gentleman, in no acute distress. 

HEENT:   __________.  Pupils are equal and reactive to light and accommodation.

Extraocular muscles are intact. 

NECK:  Supple.  No JVD.  No thyromegaly.  No bruits. 

CARDIOVASCULAR:  First and second heart sounds were heard.  No murmurs, rubs, or

gallops. 

RESPIRATORY:  Good air entry bilaterally.  No crackles.  No rales.  No wheezes. 

ABDOMEN:  Bowel sounds are present.  Nondistended.  Nontender. 

EXTREMITIES:  No cyanosis.  No clubbing.  No edema.



MEDICATIONS:  Kindly see medication reconciliation list.



FOLLOWUP:  The patient is to follow up with Infectious Disease in the next 1 month.



ACTIVITIES:  As tolerated.



DIET:  Cardiac diet. 

Plan was discussed with patient.  He agrees with the plan and expressed

understanding. 



TIME SPENT:  Time spent on discharge was 30 minutes.







Job ID:  [id number]

## 2020-02-03 NOTE — PQF
CONSTANZA DAMON SAVAN, MD

[id number]                                                             T4-A-
[id number]

[id number]                             

                                   

CLINICAL DOCUMENTATION CLARIFICATION FORM:  POST DISCHARGE



Addendum to original discharge summary date:  __________________________________
____



Late entry note date:  _________________________________________________________
__











DATE:02/03/2020                                                   ATTN:ELSA MEJÍA MD





Please exercise your independent, professional judgment in responding to the 
clarification form. 

Clinical indicators are provided on the bottom of this form for your review





Please check appropriate box(s) to clarify if the following diagnosis has been 
ruled in or ruled out: Sepsis



[  ] Ruled in diagnosis

     [  ] Continue to treat        [  ] Resolved

[  ] Ruled out diagnosis

[  ] Cannot rule out diagnosis

[  ] Other diagnosis ___________

[  ] Unable to determine



In addition, please specify:

Present on Admission (POA):  [  ] Yes             [  ] No             [  ] 
Unable to determine



For continuity of documentation, please document condition throughout progress 
notes and discharge summary.  Thank You.



CLINICAL INDICATORS - SIGNS / SYMPTOMS / LABS



Left leg cellulitis-Documented in Discharge summary on 01/25 by Devang Putnam MD

Upon presentation to the emergency room he was diagnosed with left  lower 
extremity swelling as well as sepsis -Documented in Discharge summary on 01/25 
by Devang Putnam MD

They felt that patient's symptoms were consistent with erysipelas and 
recommended for long term antibiotics as well as suppressive therapy-Documented 
in Discharge summary on 01/25 by Devang Putnam MD





RISK FACTORS

Left leg cellulitis-Documented in Discharge summary on 01/25 by Devang Putnam MD





TREATMENTS

Started on appropriate antibiotics and infectious disease was consulted.They 
saw patient and felt that patient will require aggressive therapy-Documented in 
Discharge summary on 01/25 by Devang Putnam MD





 [employer] Crystal Reports Winform Viewer

(This form is maintained as a part of the permanent medical record)

2015 [employer].  All Rights Reserved

[email or web address]    [phone]
[phone]

                                                              



GIORGI

## 2020-02-14 ENCOUNTER — HOSPITAL ENCOUNTER (OUTPATIENT)
Dept: HOSPITAL 92 - ERS | Age: 70
Setting detail: OBSERVATION
LOS: 1 days | Discharge: HOME | End: 2020-02-15
Attending: HOSPITALIST | Admitting: HOSPITALIST
Payer: MEDICARE

## 2020-02-14 VITALS — BODY MASS INDEX: 31.5 KG/M2

## 2020-02-14 DIAGNOSIS — I10: ICD-10-CM

## 2020-02-14 DIAGNOSIS — R73.03: ICD-10-CM

## 2020-02-14 DIAGNOSIS — Z79.899: ICD-10-CM

## 2020-02-14 DIAGNOSIS — Z79.82: ICD-10-CM

## 2020-02-14 DIAGNOSIS — F41.1: ICD-10-CM

## 2020-02-14 DIAGNOSIS — Z88.1: ICD-10-CM

## 2020-02-14 DIAGNOSIS — E03.9: ICD-10-CM

## 2020-02-14 DIAGNOSIS — L03.032: Primary | ICD-10-CM

## 2020-02-14 DIAGNOSIS — Z79.01: ICD-10-CM

## 2020-02-14 DIAGNOSIS — E78.5: ICD-10-CM

## 2020-02-14 DIAGNOSIS — Z87.891: ICD-10-CM

## 2020-02-14 DIAGNOSIS — I25.10: ICD-10-CM

## 2020-02-14 LAB
ALBUMIN SERPL BCG-MCNC: 4.2 G/DL (ref 3.4–4.8)
ALP SERPL-CCNC: 96 U/L (ref 40–110)
ALT SERPL W P-5'-P-CCNC: 40 U/L (ref 8–55)
ANION GAP SERPL CALC-SCNC: 11 MMOL/L (ref 10–20)
AST SERPL-CCNC: 25 U/L (ref 5–34)
BASOPHILS # BLD AUTO: 0.1 THOU/UL (ref 0–0.2)
BASOPHILS NFR BLD AUTO: 1 % (ref 0–1)
BILIRUB SERPL-MCNC: 0.6 MG/DL (ref 0.2–1.2)
BUN SERPL-MCNC: 13 MG/DL (ref 8.4–25.7)
CALCIUM SERPL-MCNC: 9.5 MG/DL (ref 7.8–10.44)
CHLORIDE SERPL-SCNC: 103 MMOL/L (ref 98–107)
CO2 SERPL-SCNC: 28 MMOL/L (ref 23–31)
CREAT CL PREDICTED SERPL C-G-VRATE: 0 ML/MIN (ref 70–130)
EOSINOPHIL # BLD AUTO: 0.3 THOU/UL (ref 0–0.7)
EOSINOPHIL NFR BLD AUTO: 5.3 % (ref 0–10)
GLOBULIN SER CALC-MCNC: 3.7 G/DL (ref 2.4–3.5)
GLUCOSE SERPL-MCNC: 124 MG/DL (ref 83–110)
HGB BLD-MCNC: 14.5 G/DL (ref 14–18)
LYMPHOCYTES # BLD: 1.7 THOU/UL (ref 1.2–3.4)
LYMPHOCYTES NFR BLD AUTO: 26.9 % (ref 21–51)
MCH RBC QN AUTO: 34.5 PG (ref 27–31)
MCV RBC AUTO: 103 FL (ref 78–98)
MONOCYTES # BLD AUTO: 0.4 THOU/UL (ref 0.11–0.59)
MONOCYTES NFR BLD AUTO: 6.9 % (ref 0–10)
NEUTROPHILS # BLD AUTO: 3.7 THOU/UL (ref 1.4–6.5)
NEUTROPHILS NFR BLD AUTO: 59.9 % (ref 42–75)
PLATELET # BLD AUTO: 230 THOU/UL (ref 130–400)
POTASSIUM SERPL-SCNC: 4.7 MMOL/L (ref 3.5–5.1)
RBC # BLD AUTO: 4.22 MILL/UL (ref 4.7–6.1)
SODIUM SERPL-SCNC: 137 MMOL/L (ref 136–145)
WBC # BLD AUTO: 6.1 THOU/UL (ref 4.8–10.8)

## 2020-02-14 PROCEDURE — 85025 COMPLETE CBC W/AUTO DIFF WBC: CPT

## 2020-02-14 PROCEDURE — 86140 C-REACTIVE PROTEIN: CPT

## 2020-02-14 PROCEDURE — 96367 TX/PROPH/DG ADDL SEQ IV INF: CPT

## 2020-02-14 PROCEDURE — 96366 THER/PROPH/DIAG IV INF ADDON: CPT

## 2020-02-14 PROCEDURE — 96365 THER/PROPH/DIAG IV INF INIT: CPT

## 2020-02-14 PROCEDURE — 87205 SMEAR GRAM STAIN: CPT

## 2020-02-14 PROCEDURE — 80053 COMPREHEN METABOLIC PANEL: CPT

## 2020-02-14 PROCEDURE — 85652 RBC SED RATE AUTOMATED: CPT

## 2020-02-14 PROCEDURE — 36415 COLL VENOUS BLD VENIPUNCTURE: CPT

## 2020-02-14 PROCEDURE — 87070 CULTURE OTHR SPECIMN AEROBIC: CPT

## 2020-02-14 PROCEDURE — 87186 SC STD MICRODIL/AGAR DIL: CPT

## 2020-02-14 PROCEDURE — 96376 TX/PRO/DX INJ SAME DRUG ADON: CPT

## 2020-02-14 PROCEDURE — 87077 CULTURE AEROBIC IDENTIFY: CPT

## 2020-02-14 PROCEDURE — G0378 HOSPITAL OBSERVATION PER HR: HCPCS

## 2020-02-14 NOTE — PDOC.HHP
Hospitalist HPI





- History of Present Illness


Worsening Left Leg cellulitis


History of Present Illness: 


69M presents to the ED for evaluation of worsening left leg cellulitis. Patient 

reports cellulitis started 4 weeks ago, he was started on ABX as an outpatient, 

cellulitis worsened and he came to the hospital and was admitted for IV 

antibiotics. He was seen by Dr. Nolasco and a podiatrist came to see and removed 

left toenail which was ingrown. He was sent home 2 weeks ago with Keflex and 

daily PCN which Dr. Nolasco instructed he should be on for a year. Patient 

reports he finished the Keflex on Wednesday





Patient states wound has never improved and he noticed a different odor in the 

wound 2-3 days ago. Denies fever, chills. Denies wound care treatment as an 

outpatient, reports his wife does wound care for him. No elevated WBC on lab 

work today. 





Patient was given Vancomycin and Zosyn and will be admitted for IV ABX. 








Hospitalist ROS





- Review of Systems


Constitutional: denies: fever, chills, weakness


Eyes: denies: pain, vision change, conjunctivae inflammation, eyelid 

inflammation, redness, other


ENT: denies: ear pain, ear discharge, nose pain, nose discharge, nose congestion

, mouth pain, mouth swelling, throat pain, throat swelling, other


Respiratory: denies: cough, dry, shortness of breath, hemoptysis, SOB with 

excertion, pleuritic pain, sputum, wheezing, other


Cardiovascular: denies: chest pain, palpitations, orthopnea, paroxysmal noc. 

dyspnea, edema, light headedness, other


Gastrointestinal: denies: nausea, vomiting, abdominal pain, diarrhea, 

constipation, melena, hematochezia, other


Genitourinary: denies: dysuria, frequency, incontinence, hematuria, retention, 

other


Musculoskeletal: reports: leg pain (Intermittent left lower leg pain)


Skin: reports: lesions (Anterior left lower leg with warmth, erythema and 

bullous lesions)





Hospitalist History





- Past Medical History


Cardiac: reports: CAD, HTN, Hyperlipidemia


Pulmonary: reports: hypertension


Gastrointestinal: reports: no pertinent history, Other (colon ca in past)


Heme/Onc: reports: Cancer


Hepatobiliary: reports: no pertinent history


Psych: reports: no pertinent history, Anxiety


Rheumatologic: reports: Other (Rheumatic fever as a child)


Endocrine: reports: Other (Graves disease)





- Past Surgical History


Past Surgical History: reports: Appendectomy, Hernia Repair





- Social History


Smoking Status: Former smoker


Alcohol: reports: Occassional


Living Situation: With Family





- Exam


Eye: PERRL


ENT: normocephalic atraumatic, moist mucosa


Neck: supple, no JVD


Heart: RRR, no murmur, normal peripheral pulses


Respiratory: CTAB, normal chest expansion


Gastrointestinal: soft, non-tender, normal bowel sounds


Extremities: no cyanosis, no edema


Skin - other findings: anterior left leg with large area of erythema and warmth

, bullous lesions 


Neurological: cranial nerve grossly intact, no focal deficits


Musculoskeletal: normal tone, normal strength


Psychiatric: normal affect, A&O x 3





Hospitalist Results





- Labs


Result Diagrams: 


 02/14/20 12:36





 02/14/20 12:36


Lab results: 


 











WBC  6.1 thou/uL (4.8-10.8)   02/14/20  12:36    


 


Hgb  14.5 g/dL (14.0-18.0)   02/14/20  12:36    


 


Hct  43.4 % (42.0-52.0)   02/14/20  12:36    


 


MCV  103.0 fL (78.0-98.0)  H  02/14/20  12:36    


 


Plt Count  230 thou/uL (130-400)   02/14/20  12:36    


 


Neutrophils %  59.9 % (42.0-75.0)   02/14/20  12:36    


 


ESR Westergren  13 mm/hr (Less than 20)   02/14/20  16:21    


 


Sodium  137 mmol/L (136-145)   02/14/20  12:36    


 


Potassium  4.7 mmol/L (3.5-5.1)   02/14/20  12:36    


 


Chloride  103 mmol/L ()   02/14/20  12:36    


 


Carbon Dioxide  28 mmol/L (23-31)   02/14/20  12:36    


 


BUN  13 mg/dL (8.4-25.7)   02/14/20  12:36    


 


Creatinine  1.32 mg/dL (0.7-1.3)  H  02/14/20  12:36    


 


Glucose  124 mg/dL ()  H  02/14/20  12:36    


 


Calcium  9.5 mg/dL (7.8-10.44)   02/14/20  12:36    


 


Total Bilirubin  0.6 mg/dL (0.2-1.2)   02/14/20  12:36    


 


AST  25 U/L (5-34)   02/14/20  12:36    


 


ALT  40 U/L (8-55)   02/14/20  12:36    


 


Alkaline Phosphatase  96 U/L ()   02/14/20  12:36    


 


C-Reactive Protein  Less than  0.50 mg/dL (= or < 0.5)   02/14/20  16:21    


 


Serum Total Protein  7.9 g/dL (5.8-8.1)   02/14/20  12:36    


 


Albumin  4.2 g/dL (3.4-4.8)   02/14/20  12:36    














Hospitalist H&P A/P





- Problem


(1) Left leg cellulitis


Code(s): L03.116 - CELLULITIS OF LEFT LOWER LIMB   Status: Acute   





(2) Anxiety disorder


Code(s): F41.9 - ANXIETY DISORDER, UNSPECIFIED   Status: Chronic   


Qualifiers: 


   Anxiety disorder type: generalized anxiety disorder   Qualified Code(s): 

F41.1 - Generalized anxiety disorder   





(3) CAD (coronary artery disease)


Code(s): I25.10 - ATHSCL HEART DISEASE OF NATIVE CORONARY ARTERY W/O ANG PCTRS 

  Status: Chronic   


Qualifiers: 


   Coronary Disease-Associated Artery/Lesion type: native artery   Native vs. 

transplanted heart: native heart   Associated angina: without angina   

Qualified Code(s): I25.10 - Atherosclerotic heart disease of native coronary 

artery without angina pectoris   





(4) Dyslipidemia


Code(s): E78.5 - HYPERLIPIDEMIA, UNSPECIFIED   Status: Chronic   





(5) HTN (hypertension)


Code(s): I10 - ESSENTIAL (PRIMARY) HYPERTENSION   Status: Chronic   


Qualifiers: 


   Hypertension type: essential hypertension   Qualified Code(s): I10 - 

Essential (primary) hypertension   





(6) Hypothyroidism


Code(s): E03.9 - HYPOTHYROIDISM, UNSPECIFIED   Status: Chronic   


Qualifiers: 


   Hypothyroidism type: unspecified   Qualified Code(s): E03.9 - Hypothyroidism

, unspecified   





- Plan


Plan: 


Will continue Vancomycin (dosed per Pharmacy) and Zosyn


 Wound culture, wound care consult ordered


 Will repeat labs in AM


 Dr. Nolasco has been consulted


 Patient on Eliquis which we will restart, add pepcid for GI prophylaxis


 Will restart home meds when reconciled


 Will hold oral PCN while on IV ABX


 Case discussed with Dr. Chi who agrees with plan


 Patient is a full code

## 2020-02-14 NOTE — RAD
Frontal and lateral imaging of the left tibia/fibula:

2/14/2020



COMPARISON: 1/22/2020



HISTORY: Left leg infection, edema and pain



FINDINGS: There is diffuse soft tissue swelling with edematous change within the subcutaneous fat wit
hin the region of the calf. No associated subcutaneous gas. No radiopaque foreign body. No

displaced fracture or evidence of dislocation. There is enthesophyte formation at the origin of the p
lantar aponeurosis and the insertion of the Achilles tendon.



IMPRESSION: Diffuse soft tissue swelling in the region of the left calf with no associated fracture/d
islocation, subcutaneous gas, or radiopaque foreign body.



Reported By: Cecilio Osman 

Electronically Signed:  2/14/2020 4:53 PM

## 2020-02-15 VITALS — DIASTOLIC BLOOD PRESSURE: 82 MMHG | TEMPERATURE: 97.6 F | SYSTOLIC BLOOD PRESSURE: 164 MMHG

## 2020-02-15 LAB
ALBUMIN SERPL BCG-MCNC: 3.7 G/DL (ref 3.4–4.8)
ALP SERPL-CCNC: 82 U/L (ref 40–110)
ALT SERPL W P-5'-P-CCNC: 33 U/L (ref 8–55)
ANION GAP SERPL CALC-SCNC: 11 MMOL/L (ref 10–20)
AST SERPL-CCNC: 20 U/L (ref 5–34)
BASOPHILS # BLD AUTO: 0.1 THOU/UL (ref 0–0.2)
BASOPHILS NFR BLD AUTO: 1.1 % (ref 0–1)
BILIRUB SERPL-MCNC: 0.7 MG/DL (ref 0.2–1.2)
BUN SERPL-MCNC: 14 MG/DL (ref 8.4–25.7)
CALCIUM SERPL-MCNC: 8.8 MG/DL (ref 7.8–10.44)
CHLORIDE SERPL-SCNC: 106 MMOL/L (ref 98–107)
CO2 SERPL-SCNC: 26 MMOL/L (ref 23–31)
CREAT CL PREDICTED SERPL C-G-VRATE: 88 ML/MIN (ref 70–130)
EOSINOPHIL # BLD AUTO: 0.3 THOU/UL (ref 0–0.7)
EOSINOPHIL NFR BLD AUTO: 5.3 % (ref 0–10)
GLOBULIN SER CALC-MCNC: 3.2 G/DL (ref 2.4–3.5)
GLUCOSE SERPL-MCNC: 110 MG/DL (ref 83–110)
HGB BLD-MCNC: 13.4 G/DL (ref 14–18)
LYMPHOCYTES # BLD: 1.7 THOU/UL (ref 1.2–3.4)
LYMPHOCYTES NFR BLD AUTO: 26.2 % (ref 21–51)
MCH RBC QN AUTO: 34.8 PG (ref 27–31)
MCV RBC AUTO: 102 FL (ref 78–98)
MONOCYTES # BLD AUTO: 0.5 THOU/UL (ref 0.11–0.59)
MONOCYTES NFR BLD AUTO: 8.2 % (ref 0–10)
NEUTROPHILS # BLD AUTO: 3.7 THOU/UL (ref 1.4–6.5)
NEUTROPHILS NFR BLD AUTO: 59.3 % (ref 42–75)
PLATELET # BLD AUTO: 187 THOU/UL (ref 130–400)
POTASSIUM SERPL-SCNC: 4.4 MMOL/L (ref 3.5–5.1)
RBC # BLD AUTO: 3.85 MILL/UL (ref 4.7–6.1)
SODIUM SERPL-SCNC: 139 MMOL/L (ref 136–145)
WBC # BLD AUTO: 6.3 THOU/UL (ref 4.8–10.8)

## 2020-02-15 NOTE — DIS
DATE OF ADMISSION:  02/14/2020



DATE OF DISCHARGE:  02/15/2020



DISPOSITION:  Discharged home.



PRIMARY CARE PHYSICIAN:  No PCP.



FINAL DIAGNOSES:  

1. Cellulitis, on oral antibiotics.

2. Hypertension.

3. Dyslipidemia.

4. Coronary artery disease.

5. Hypothyroidism.



DISCHARGE MEDICATIONS:  

1. Cephalexin 500 mg p.o. q.i.d. for 2 weeks.

2. Levothyroxine 175 mcg a day.

3. Lipitor 40 mg a day.

4. Aspirin 81 mg a day.

5. Eliquis 5 mg a day.

6. Amlodipine 5 mg a day.

7. Elavil 50 mg a day.

8. Losartan 100 mg a day.



ALLERGIES:  TO BACITRACIN, NEOMYCIN, POLYMYXIN B.



DIET:  Heart healthy.



CODE STATUS:  Full.



PENDING AT TIME OF DISCHARGE:  Wound culture growing a gram-negative ezra, which I

suspect is not part of his pathology. 



HOSPITAL COURSE:  The patient, who was recently admitted to the hospital, had an

acute invasive cellulitis, eventually discharged on oral cephalexin, to be followed

up with Dr. Nolasco.  He presented with complaints of cellulitis of his legs.  He has

had no fever.  His white count was unremarkable at 6.1 with no left shift, followed

up by 6.3 with no left shift.  His leg lesion is warm, dry with no drainage.  I am

not sure where this wound culture came from.  There does not appear to be an open

lesion on his leg at this time.  I have discussed ongoing therapy with him.  We have

decided to continue his cephalexin, which he had finished.  He was on suppressive

therapy with low-dose Pen-VK for 2 more weeks and have him follow up with Dr. Nolasco.

 He already has an appointment. 



CONSULTATIONS WHILE IN HOSPITAL:  None.



PROCEDURES:  None.



FOLLOWUP:  As mentioned before, follow up with Dr. Nolasco.







Job ID:  [id number]